# Patient Record
Sex: MALE
[De-identification: names, ages, dates, MRNs, and addresses within clinical notes are randomized per-mention and may not be internally consistent; named-entity substitution may affect disease eponyms.]

---

## 2017-10-31 PROBLEM — Z00.00 ENCOUNTER FOR PREVENTIVE HEALTH EXAMINATION: Status: ACTIVE | Noted: 2017-10-31

## 2017-11-02 ENCOUNTER — APPOINTMENT (OUTPATIENT)
Dept: ORTHOPEDIC SURGERY | Facility: CLINIC | Age: 52
End: 2017-11-02
Payer: MEDICAID

## 2017-11-02 VITALS
HEIGHT: 69 IN | WEIGHT: 210 LBS | DIASTOLIC BLOOD PRESSURE: 85 MMHG | HEART RATE: 79 BPM | SYSTOLIC BLOOD PRESSURE: 141 MMHG | BODY MASS INDEX: 31.1 KG/M2

## 2017-11-02 DIAGNOSIS — M17.0 BILATERAL PRIMARY OSTEOARTHRITIS OF KNEE: ICD-10-CM

## 2017-11-02 PROCEDURE — 99204 OFFICE O/P NEW MOD 45 MIN: CPT | Mod: 25

## 2017-11-02 PROCEDURE — 20610 DRAIN/INJ JOINT/BURSA W/O US: CPT | Mod: RT

## 2017-11-02 PROCEDURE — 73564 X-RAY EXAM KNEE 4 OR MORE: CPT | Mod: RT

## 2017-11-02 RX ORDER — METHYLPRED ACET/NACL,ISO-OS/PF 40 MG/ML
40 VIAL (ML) INJECTION
Qty: 2 | Refills: 0 | Status: ACTIVE | COMMUNITY
Start: 2017-11-02

## 2017-11-21 ENCOUNTER — MESSAGE (OUTPATIENT)
Age: 52
End: 2017-11-21

## 2017-12-07 RX ORDER — NABUMETONE 500 MG/1
500 TABLET, FILM COATED ORAL
Qty: 30 | Refills: 0 | Status: ACTIVE | COMMUNITY
Start: 2017-12-07 | End: 1900-01-01

## 2019-12-06 ENCOUNTER — HOSPITAL ENCOUNTER (INPATIENT)
Dept: HOSPITAL 74 - YASAS | Age: 54
LOS: 3 days | Discharge: LEFT BEFORE DISCHARGE | DRG: 770 | End: 2019-12-09
Attending: ALLERGY & IMMUNOLOGY | Admitting: ALLERGY & IMMUNOLOGY
Payer: COMMERCIAL

## 2019-12-06 VITALS — BODY MASS INDEX: 30.4 KG/M2

## 2019-12-06 DIAGNOSIS — E78.5: ICD-10-CM

## 2019-12-06 DIAGNOSIS — E11.9: ICD-10-CM

## 2019-12-06 DIAGNOSIS — K21.9: ICD-10-CM

## 2019-12-06 DIAGNOSIS — F17.210: ICD-10-CM

## 2019-12-06 DIAGNOSIS — M54.5: ICD-10-CM

## 2019-12-06 DIAGNOSIS — I10: ICD-10-CM

## 2019-12-06 DIAGNOSIS — Z79.4: ICD-10-CM

## 2019-12-06 DIAGNOSIS — F11.23: Primary | ICD-10-CM

## 2019-12-07 PROCEDURE — HZ2ZZZZ DETOXIFICATION SERVICES FOR SUBSTANCE ABUSE TREATMENT: ICD-10-PCS | Performed by: ALLERGY & IMMUNOLOGY

## 2019-12-07 RX ADMIN — NICOTINE SCH: 14 PATCH, EXTENDED RELEASE TRANSDERMAL at 10:53

## 2019-12-07 RX ADMIN — INSULIN ASPART SCH UNITS: 100 INJECTION, SOLUTION INTRAVENOUS; SUBCUTANEOUS at 17:08

## 2019-12-07 RX ADMIN — HYDROXYZINE PAMOATE PRN MG: 25 CAPSULE ORAL at 22:29

## 2019-12-07 RX ADMIN — Medication SCH TAB: at 10:54

## 2019-12-07 RX ADMIN — ASPIRIN 81 MG SCH MG: 81 TABLET ORAL at 11:36

## 2019-12-07 RX ADMIN — METFORMIN HYDROCHLORIDE SCH MG: 500 TABLET ORAL at 17:06

## 2019-12-07 RX ADMIN — Medication SCH MG: at 22:29

## 2019-12-07 RX ADMIN — INSULIN ASPART SCH: 100 INJECTION, SOLUTION INTRAVENOUS; SUBCUTANEOUS at 09:00

## 2019-12-07 RX ADMIN — METFORMIN HYDROCHLORIDE SCH MG: 500 TABLET ORAL at 11:35

## 2019-12-07 NOTE — EKG
Test Reason : 

Blood Pressure : ***/*** mmHG

Vent. Rate : 066 BPM     Atrial Rate : 066 BPM

   P-R Int : 174 ms          QRS Dur : 102 ms

    QT Int : 404 ms       P-R-T Axes : 063 -12 036 degrees

   QTc Int : 423 ms

 

NORMAL SINUS RHYTHM

NORMAL ECG

NO PREVIOUS ECGS AVAILABLE

Confirmed by ELISHA SCHULZ MD ([id number]) on 12/7/2019 11:26:24 PM

 

Referred By: Dank Puga           Confirmed By:ELISHA SCHULZ MD

## 2019-12-07 NOTE — HP
COWS





- Scale


Resting Pulse: 0= VT 80 or Below


Sweatin=Flushed/Facial Moisture


Restless Observation: 0= Sits Still


Pupil Size: 1= Pupils >than Normal


Bone or Joint Aches: 4=Acute Joint/Muscle Pain


Runny Nose/ Eye Tearin= Runny Nose/Eyes


GI Upset > 30mins: 2= Nausea/Diarrhea (diarrhea x 2)


Tremor Observation: 2= Slight Tremor Visible


Yawning Observation: 1= 1-2x During Session


Anxiety or Irritability: 2=Irritable/Anxious


Goose Flesh Skin: 0=Smooth Skin


COWS Score: 16





CIWA Score





- Admission Criteria


[facility] Guidelines: Admission for Medically Managed Detox: 


Requires at least one of the followin. CIWA greater than 12


2. Seizures within the past 24 hours


3. Delirium tremens within the past 24 hours


4. Hallucinations within the past 24 hours


5. Acute intervention needed for co  occurring medical disorder


6. Acute intervention needed for co  occurring psychiatric disorder


7. Severe withdrawal that cannot be handled at a lower level of care (continued


    vomiting, continued diarrhea, abnormal vital signs) requiring intravenous


    medication and/or fluids


8. Pregnancy








Admission ROS [facility]


Chief Complaint: 





Heroin withdrawal symptoms


Allergies/Adverse Reactions: 


 Allergies











Allergy/AdvReac Type Severity Reaction Status Date / Time


 


No Known Allergies Allergy   Verified 19 21:10











History of Present Illness: 





54 years old male with a  long history of  heroin dependence is seeking 

admission to detox. He states that he has been in detox several years ago and 

reports insignificant period of sobriety. He has medical history of DM type 2, 

and low back pain. This isd his first admission to [facility]  


Exam Limitations: Physical Impairment





- Ebola screening


Have you traveled outside of the country in the last 21 days: No (N)


Have you had contact with anyone from an Ebola affected area: No


Do you have a fever: No





- Review of Systems


Constitutional: Chills, Malaise, Night Sweats, Changes in sleep


EENT: reports: No Symptoms Reported, Nose Congestion


Respiratory: reports: No Symptoms reported


Cardiac: reports: No Symptoms Reported


GI: reports: No Symptoms Reported, Abdominal Distended


: reports: No Symptoms Reported


Musculoskeletal: reports: Back Pain, Joint Pain, Other (bilateral log)


Integumentary: reports: Dryness, Flushing


Neuro: reports: Headache, Tremors


Endocrine: reports: No Symptoms Reported


Hematology: reports: No Symptoms Reported


Psychiatric: reports: Mood/Affect Appropiate, Orientated x3, Anxious


Other Systems: Reviewed and Negative





Patient History





- Patient Medical History


Hx Anemia: No


Hx Asthma: No


Hx Chronic Obstructive Pulmonary Disease (COPD): No


Hx Cancer: No


Hx Cardiac Disorders: No


Hx Congestive Heart Failure: No


Hx Hypertension: Yes


Hx Hypercholesterolemia: Yes


Hx Pacemaker: No


HX Cerebrovascular Accident: No


Hx Seizures: No


Hx Dementia: No


Hx Diabetes: Yes (Metformin)


Hx Gastrointestinal Disorders: Yes (Omeprazole)


Hx Liver Disease: No


Hx Genitourinary Disorders: No


Hx Sexually Transmitted Disorders: No


Hx Renal Disease (ESRD): No


Hx Thyroid Disease: No


Hx Human Immunodeficiency Virus (HIV): No (Negative)


Hx Hepatitis C: No


Hx Depression: No


Hx Suicide Attempt: No (Denies suicidal ideation at this time)


Hx Bipolar Disorder: No


Hx Schizophrenia: No





- Patient Surgical History


Past Surgical History: Yes


Hx Neurologic Surgery: No


Hx Cataract Extraction: No


Hx Cardiac Surgery: No


Hx Lung Surgery: No


Hx Abdominal Surgery: No


Hx Appendectomy: No


Hx Cholecystectomy: No


Hx Genitourinary Surgery: No


Hx Orthopedic Surgery: No


Other Surgical History: Hemmoroid 


Anesthesia Reaction: No





- PPD History


Previous Implant?: Yes


Documented Results: Negative w/o proof


Implanted On Prior R Admission?: No


PPD to be Administered?: Yes





- Reproductive History


Patient is a Female of Child Bearing Age (11 -55 yrs old): No (male)





- Smoking Cessation


Smoking history: Current every day smoker


Have you smoked in the past 12 months: Yes


Aproximately how many cigarettes per day: 10


Hx Chewing Tobacco Use: No


Initiated information on smoking cessation: Yes


'Breaking Loose' booklet given: 19





- Substance & Tx. History


Hx Alcohol Use: No


Hx Substance Use: Yes


Substance Use Type: Heroin


Hx Substance Use Treatment: Yes





- Substances abused


  ** Heroin


Substance route: Inhalation


Frequency: Daily


Amount used: 1 to 2 bundles ( 1 bundle is 10 bags)


Age of first use: 50


Date of last use: 19





Admission Physical Exam [facility]





- Vital Signs


Vital Signs: 


 Vital Signs - 24 hr











  19





  21:10


 


Temperature 99.9 F H


 


Pulse Rate 77


 


Respiratory 20





Rate 


 


Blood Pressure 138/77














- Physical


General Appearance: Yes: Nourished, Tremorous, Anxious


HEENTM: Yes: EOMI, Normal ENT Inspection, Normal Voice, [place]UR[place]


Respiratory: Yes: Lungs Clear, Normal Breath Sounds, No Respiratory Distress


Neck: Yes: Within Normal Limits, Supple


Breast: Yes: Breast Exam Deferred


Cardiology: Yes: Regular Rhythm, Regular Rate


Abdominal: Yes: Normal Bowel Sounds, Soft


Genitourinary: Yes: Within Normal Limits


Back: Yes: Normal Inspection


Musculoskeletal: Yes: Within Normal Limits


Extremities: Yes: Tremors


Neurological: Yes: Fully Oriented, Motor Strength 5/5, Normal Mood/Affect


Integumentary: Yes: Warm


Lymphatic: Yes: Within Normal Limits





- Diagnostic


(1) DMII (diabetes mellitus, type 2)


Current Visit: Yes   Status: Acute   





(2) Hypertension


Current Visit: Yes   Status: Acute   





(3) Hyperlipemia


Current Visit: Yes   Status: Acute   





(4) GERD (gastroesophageal reflux disease)


Current Visit: Yes   Status: Acute   





(5) Opioid dependence with withdrawal


Current Visit: Yes   Status: Acute   





(6) Nicotine dependence


Current Visit: Yes   Status: Acute   





Cleared for Admission S





- Detox or Rehab


[facility] Level of Care: Medically Managed


Detox Regimen/Protocol: Methadone





Breathalyzer





- Breathalyzer


Breathalyzer: 0





Inpatient Rehab Admission





- Rehab Decision to Admit


Inpatient rehab admission?: No

## 2019-12-08 LAB
ALBUMIN SERPL-MCNC: 3.3 G/DL (ref 3.4–5)
ALP SERPL-CCNC: 143 U/L (ref 45–117)
ALT SERPL-CCNC: 18 U/L (ref 13–61)
ANION GAP SERPL CALC-SCNC: 7 MMOL/L (ref 8–16)
AST SERPL-CCNC: 10 U/L (ref 15–37)
BILIRUB SERPL-MCNC: 0.3 MG/DL (ref 0.2–1)
BUN SERPL-MCNC: 11.9 MG/DL (ref 7–18)
CALCIUM SERPL-MCNC: 9.5 MG/DL (ref 8.5–10.1)
CHLORIDE SERPL-SCNC: 98 MMOL/L (ref 98–107)
CO2 SERPL-SCNC: 29 MMOL/L (ref 21–32)
CREAT SERPL-MCNC: 0.9 MG/DL (ref 0.55–1.3)
DEPRECATED RDW RBC AUTO: 14.4 % (ref 11.9–15.9)
GLUCOSE SERPL-MCNC: 513 MG/DL (ref 74–106)
HCT VFR BLD CALC: 38.1 % (ref 35.4–49)
HGB BLD-MCNC: 12.5 GM/DL (ref 11.7–16.9)
MCH RBC QN AUTO: 28.7 PG (ref 25.7–33.7)
MCHC RBC AUTO-ENTMCNC: 32.8 G/DL (ref 32–35.9)
MCV RBC: 87.5 FL (ref 80–96)
PLATELET # BLD AUTO: 196 K/MM3 (ref 134–434)
PMV BLD: 11.7 FL (ref 7.5–11.1)
POTASSIUM SERPLBLD-SCNC: 4.4 MMOL/L (ref 3.5–5.1)
PROT SERPL-MCNC: 6.7 G/DL (ref 6.4–8.2)
RBC # BLD AUTO: 4.35 M/MM3 (ref 4–5.6)
SODIUM SERPL-SCNC: 134 MMOL/L (ref 136–145)
WBC # BLD AUTO: 6.2 K/MM3 (ref 4–10)

## 2019-12-08 RX ADMIN — Medication SCH: at 22:54

## 2019-12-08 RX ADMIN — METFORMIN HYDROCHLORIDE SCH MG: 500 TABLET ORAL at 07:46

## 2019-12-08 RX ADMIN — INSULIN ASPART SCH UNITS: 100 INJECTION, SOLUTION INTRAVENOUS; SUBCUTANEOUS at 08:10

## 2019-12-08 RX ADMIN — INSULIN ASPART SCH UNITS: 100 INJECTION, SOLUTION INTRAVENOUS; SUBCUTANEOUS at 17:13

## 2019-12-08 RX ADMIN — METFORMIN HYDROCHLORIDE SCH MG: 500 TABLET ORAL at 17:28

## 2019-12-08 RX ADMIN — NICOTINE SCH MG: 14 PATCH, EXTENDED RELEASE TRANSDERMAL at 10:19

## 2019-12-08 RX ADMIN — INSULIN ASPART SCH UNITS: 100 INJECTION, SOLUTION INTRAVENOUS; SUBCUTANEOUS at 12:16

## 2019-12-08 RX ADMIN — Medication SCH TAB: at 10:16

## 2019-12-08 RX ADMIN — ASPIRIN 81 MG SCH MG: 81 TABLET ORAL at 10:17

## 2019-12-08 RX ADMIN — INSULIN ASPART SCH UNITS: 100 INJECTION, SOLUTION INTRAVENOUS; SUBCUTANEOUS at 17:14

## 2019-12-08 NOTE — PN
[facility] COWS





- Scale


Resting Pulse: 0= [place] 80 or Below


Sweatin= Chills/Flushing


Restless Observation: 0= Sits Still


Pupil Size: 1= Pupils >than Normal


Bone or Joint Aches: 2= Severe Diffuse Aches


Runny Nose/ Eye Tearin= None


GI Upset > 30mins: 1= Stomach Cramp


Tremor Observation of Outstretched Hands: 2= Slight Tremor Visible


Yawning Observation: 1= 1-2x During Session


Anxiety or Irritability: 2=Irritable/Anxious


Goose Flesh Skin: 3=Piloerection


COWS Score: 13





[facility] Progress Note (SOAP)


Subjective: 





54 years old male admitted on 19 for opiate withdrawal sx management 

treated with methadone detox regimen 


reports none compliance with insulin as prescribed 


bgm gradually reducing that admission glucose 513


discuss risks of glucose elevation 


and benefits of weight loss


Objective: 





19 11:44


 Vital Signs











Temperature  99.4 F   19 09:09


 


Pulse Rate  70   19 09:09


 


Respiratory Rate  18   19 09:09


 


Blood Pressure  139/81   19 09:09


 


O2 Sat by Pulse Oximetry (%)      








 Laboratory Last Values











WBC  6.2 K/mm3 (4.0-10.0)   19  05:40    


 


RBC  4.35 M/mm3 (4.00-5.60)   19  05:40    


 


Hgb  12.5 GM/dL (11.7-16.9)   19  05:40    


 


Hct  38.1 % (35.4-49)   19  05:40    


 


MCV  87.5 fl (80-96)   19  05:40    


 


MCH  28.7 pg (25.7-33.7)   19  05:40    


 


MCHC  32.8 g/dl (32.0-35.9)   19  05:40    


 


RDW  14.4 % (11.9-15.9)   19  05:40    


 


Plt Count  196 K/MM3 (134-434)   19  05:40    


 


MPV  11.7 fl (7.5-11.1)  H  19  05:40    


 


Sodium  134 mmol/L (136-145)  L  19  05:40    


 


Potassium  4.4 mmol/L (3.5-5.1)   19  05:40    


 


Chloride  98 mmol/L ()   19  05:40    


 


Carbon Dioxide  29 mmol/L (21-32)   19  05:40    


 


Anion Gap  7 MMOL/L (8-16)  L  19  05:40    


 


BUN  11.9 mg/dL (7-18)   19  05:40    


 


Creatinine  0.9 mg/dL (0.55-1.3)   19  05:40    


 


Est GFR (CKD-EPI)AfAm  110.27   19  05:40    


 


Est GFR (CKD-EPI)NonAf  95.14   19  05:40    


 


POC Glucometer  376 UNITS ()   19  05:46    


 


Random Glucose  513 mg/dL ()  H*  19  05:40    


 


Calcium  9.5 mg/dL (8.5-10.1)   19  05:40    


 


Total Bilirubin  0.3 mg/dL (0.2-1)   19  05:40    


 


AST  10 U/L (15-37)  L  19  05:40    


 


ALT  18 U/L (13-61)   19  05:40    


 


Alkaline Phosphatase  143 U/L ()  H  19  05:40    


 


Total Protein  6.7 g/dl (6.4-8.2)   19  05:40    


 


Albumin  3.3 g/dl (3.4-5.0)  L  19  05:40    


 


RPR Titer  Nonreactive  (NONREACTIVE)   19  05:40    


 


HIV 1&2 Antibody Screen  Negative   19  07:40    


 


HIV P24 Antigen  Negative   19  07:40    








lab noted


long history of insulin dependent diabetes 


Assessment: 





19 11:45


opiate withdrawal sx 


Plan: 





continue methadone detox regimen

## 2019-12-09 VITALS — SYSTOLIC BLOOD PRESSURE: 136 MMHG | HEART RATE: 55 BPM | TEMPERATURE: 97.2 F | DIASTOLIC BLOOD PRESSURE: 79 MMHG

## 2019-12-09 RX ADMIN — INSULIN ASPART SCH UNITS: 100 INJECTION, SOLUTION INTRAVENOUS; SUBCUTANEOUS at 06:24

## 2019-12-09 RX ADMIN — HYDROXYZINE PAMOATE PRN MG: 25 CAPSULE ORAL at 03:52

## 2019-12-09 RX ADMIN — NICOTINE SCH: 14 PATCH, EXTENDED RELEASE TRANSDERMAL at 10:11

## 2019-12-09 RX ADMIN — Medication SCH TAB: at 10:08

## 2019-12-09 RX ADMIN — ASPIRIN 81 MG SCH MG: 81 TABLET ORAL at 10:08

## 2019-12-09 RX ADMIN — METFORMIN HYDROCHLORIDE SCH MG: 500 TABLET ORAL at 06:25

## 2019-12-09 NOTE — DS
[facility] Detox Discharge Summary


Admission Date: 


12/07/19





Discharge Date: 12/09/19





- History


Present History: Opioid Dependence


Additional Comments: 





54 years old male admitted on 12/07/19 for opiate withdrawal sx management


treated with methadone detox regimen 


received methadone 20mg today and insisted to leave the detox unit without 

apparent reason "I want to leave" 


encourage to explore medication assisted treatment program and  narcan 

from pharmacy


patient seems not interesting at this time





- Physical Exam Results


Vital Signs: 


 Vital Signs











Temperature  97.2 F L  12/09/19 06:14


 


Pulse Rate  55 L  12/09/19 06:14


 


Respiratory Rate  18   12/09/19 06:14


 


Blood Pressure  136/79   12/09/19 06:14


 


O2 Sat by Pulse Oximetry (%)      











Pertinent Admission Physical Exam Findings: 





opiate withdrawal sx


 Laboratory Last Values











WBC  6.2 K/mm3 (4.0-10.0)   12/08/19  05:40    


 


RBC  4.35 M/mm3 (4.00-5.60)   12/08/19  05:40    


 


Hgb  12.5 GM/dL (11.7-16.9)   12/08/19  05:40    


 


Hct  38.1 % (35.4-49)   12/08/19  05:40    


 


MCV  87.5 fl (80-96)   12/08/19  05:40    


 


MCH  28.7 pg (25.7-33.7)   12/08/19  05:40    


 


MCHC  32.8 g/dl (32.0-35.9)   12/08/19  05:40    


 


RDW  14.4 % (11.9-15.9)   12/08/19  05:40    


 


Plt Count  196 K/MM3 (134-434)   12/08/19  05:40    


 


MPV  11.7 fl (7.5-11.1)  H  12/08/19  05:40    


 


Sodium  134 mmol/L (136-145)  L  12/08/19  05:40    


 


Potassium  4.4 mmol/L (3.5-5.1)   12/08/19  05:40    


 


Chloride  98 mmol/L ()   12/08/19  05:40    


 


Carbon Dioxide  29 mmol/L (21-32)   12/08/19  05:40    


 


Anion Gap  7 MMOL/L (8-16)  L  12/08/19  05:40    


 


BUN  11.9 mg/dL (7-18)   12/08/19  05:40    


 


Creatinine  0.9 mg/dL (0.55-1.3)   12/08/19  05:40    


 


Est GFR (CKD-EPI)AfAm  110.27   12/08/19  05:40    


 


Est GFR (CKD-EPI)NonAf  95.14   12/08/19  05:40    


 


POC Glucometer  346 UNITS ()   12/09/19  05:17    


 


Random Glucose  513 mg/dL ()  H*  12/08/19  05:40    


 


Calcium  9.5 mg/dL (8.5-10.1)   12/08/19  05:40    


 


Total Bilirubin  0.3 mg/dL (0.2-1)   12/08/19  05:40    


 


AST  10 U/L (15-37)  L  12/08/19  05:40    


 


ALT  18 U/L (13-61)   12/08/19  05:40    


 


Alkaline Phosphatase  143 U/L ()  H  12/08/19  05:40    


 


Total Protein  6.7 g/dl (6.4-8.2)   12/08/19  05:40    


 


Albumin  3.3 g/dl (3.4-5.0)  L  12/08/19  05:40    


 


RPR Titer  Nonreactive  (NONREACTIVE)   12/08/19  05:40    


 


HIV 1&2 Antibody Screen  Negative   12/07/19  07:40    


 


HIV P24 Antigen  Negative   12/07/19  07:40    








lab noted


long history of diabetes 


encourage weight loss and dietary compliance as well as pharmacotherapy





- Treatment


Hospital Course: Detox Protocol Followed, Responded well, Discharged Condition 

Good


Patient has Accepted [facility] 





- Medication


Discharge Medications: 


Ambulatory Orders





Aspirin [ASA -] 81 mg PO DAILY 12/06/19 


Insulin Aspart [Novolog] 30 unit SQ BID 12/06/19 


Metformin HCl [Glucophage] 1,000 mg PO BID 12/06/19 


Naloxone HCl [Narcan] 4 mg NS ASDIR PRN #1 spray 12/08/19 











- AMA


Did Patient Leave Against Medical Advice: Yes